# Patient Record
Sex: FEMALE | HISPANIC OR LATINO
[De-identification: names, ages, dates, MRNs, and addresses within clinical notes are randomized per-mention and may not be internally consistent; named-entity substitution may affect disease eponyms.]

---

## 2018-06-14 ENCOUNTER — APPOINTMENT (OUTPATIENT)
Dept: PHYSICAL MEDICINE AND REHAB | Facility: CLINIC | Age: 53
End: 2018-06-14
Payer: COMMERCIAL

## 2018-06-14 DIAGNOSIS — Z78.9 OTHER SPECIFIED HEALTH STATUS: ICD-10-CM

## 2018-06-14 DIAGNOSIS — Z87.39 PERSONAL HISTORY OF OTHER DISEASES OF THE MUSCULOSKELETAL SYSTEM AND CONNECTIVE TISSUE: ICD-10-CM

## 2018-06-14 DIAGNOSIS — Z82.69 FAMILY HISTORY OF OTHER DISEASES OF THE MUSCULOSKELETAL SYSTEM AND CONNECTIVE TISSUE: ICD-10-CM

## 2018-06-14 PROBLEM — Z00.00 ENCOUNTER FOR PREVENTIVE HEALTH EXAMINATION: Status: ACTIVE | Noted: 2018-06-14

## 2018-06-14 PROCEDURE — 99204 OFFICE O/P NEW MOD 45 MIN: CPT

## 2018-06-14 RX ORDER — HYDROCORTISONE 1 %
12 CREAM (GRAM) TOPICAL
Qty: 225 | Refills: 0 | Status: ACTIVE | COMMUNITY
Start: 2018-04-24

## 2018-06-14 RX ORDER — FLUOCINONIDE 0.5 MG/ML
0.05 SOLUTION TOPICAL
Qty: 60 | Refills: 0 | Status: ACTIVE | COMMUNITY
Start: 2018-04-24

## 2018-06-14 RX ORDER — TRETINOIN 0.25 MG/G
0.03 CREAM TOPICAL
Qty: 45 | Refills: 0 | Status: ACTIVE | COMMUNITY
Start: 2017-07-14

## 2018-06-14 RX ORDER — METHYLPREDNISOLONE 4 MG/1
4 TABLET ORAL
Qty: 55 | Refills: 0 | Status: ACTIVE | COMMUNITY
Start: 2018-06-14 | End: 1900-01-01

## 2018-06-14 RX ORDER — TRETINOIN 0.5 MG/G
0.05 CREAM TOPICAL
Qty: 45 | Refills: 0 | Status: ACTIVE | COMMUNITY
Start: 2018-04-24

## 2018-06-14 RX ORDER — MELOXICAM 15 MG/1
15 TABLET ORAL
Qty: 30 | Refills: 0 | Status: ACTIVE | COMMUNITY
Start: 2017-09-29

## 2018-06-14 RX ORDER — CYCLOBENZAPRINE HYDROCHLORIDE 5 MG/1
5 TABLET, FILM COATED ORAL
Qty: 30 | Refills: 0 | Status: ACTIVE | COMMUNITY
Start: 2018-06-06

## 2018-06-14 RX ORDER — ALBUTEROL SULFATE 90 UG/1
108 (90 BASE) AEROSOL, METERED RESPIRATORY (INHALATION)
Qty: 8 | Refills: 0 | Status: ACTIVE | COMMUNITY
Start: 2018-06-07

## 2018-06-15 VITALS — OXYGEN SATURATION: 16 % | WEIGHT: 130 LBS | HEIGHT: 60 IN | BODY MASS INDEX: 25.52 KG/M2

## 2018-06-15 PROBLEM — Z78.9 CONSUMES ALCOHOL OCCASIONALLY: Status: ACTIVE | Noted: 2018-06-14

## 2018-06-15 PROBLEM — Z82.69 FAMILY HISTORY OF SCOLIOSIS: Status: ACTIVE | Noted: 2018-06-14

## 2018-06-15 PROBLEM — Z78.9 NON-SMOKER: Status: ACTIVE | Noted: 2018-06-14

## 2018-06-15 PROBLEM — Z87.39 HISTORY OF SCOLIOSIS: Status: RESOLVED | Noted: 2018-06-14 | Resolved: 2018-06-15

## 2018-06-21 ENCOUNTER — APPOINTMENT (OUTPATIENT)
Dept: PHYSICAL MEDICINE AND REHAB | Facility: CLINIC | Age: 53
End: 2018-06-21

## 2018-06-28 ENCOUNTER — APPOINTMENT (OUTPATIENT)
Dept: PHYSICAL MEDICINE AND REHAB | Facility: CLINIC | Age: 53
End: 2018-06-28
Payer: COMMERCIAL

## 2018-06-28 VITALS — RESPIRATION RATE: 16 BRPM | HEIGHT: 60 IN | WEIGHT: 130 LBS | BODY MASS INDEX: 25.52 KG/M2

## 2018-06-28 PROCEDURE — 99214 OFFICE O/P EST MOD 30 MIN: CPT

## 2018-07-30 ENCOUNTER — APPOINTMENT (OUTPATIENT)
Dept: PHYSICAL MEDICINE AND REHAB | Facility: CLINIC | Age: 53
End: 2018-07-30
Payer: COMMERCIAL

## 2018-07-30 VITALS — HEIGHT: 60 IN | HEART RATE: 87 BPM | RESPIRATION RATE: 16 BRPM | BODY MASS INDEX: 20.62 KG/M2 | WEIGHT: 105 LBS

## 2018-07-30 PROCEDURE — 99214 OFFICE O/P EST MOD 30 MIN: CPT

## 2020-07-07 ENCOUNTER — APPOINTMENT (OUTPATIENT)
Dept: PHYSICAL MEDICINE AND REHAB | Facility: CLINIC | Age: 55
End: 2020-07-07
Payer: COMMERCIAL

## 2020-07-07 VITALS — RESPIRATION RATE: 16 BRPM | BODY MASS INDEX: 20.62 KG/M2 | HEIGHT: 60 IN | OXYGEN SATURATION: 98 % | WEIGHT: 105 LBS

## 2020-07-07 DIAGNOSIS — M75.41 IMPINGEMENT SYNDROME OF RIGHT SHOULDER: ICD-10-CM

## 2020-07-07 PROCEDURE — 99214 OFFICE O/P EST MOD 30 MIN: CPT

## 2021-01-29 ENCOUNTER — APPOINTMENT (OUTPATIENT)
Dept: PHYSICAL MEDICINE AND REHAB | Facility: CLINIC | Age: 56
End: 2021-01-29
Payer: COMMERCIAL

## 2021-01-29 DIAGNOSIS — M54.16 RADICULOPATHY, LUMBAR REGION: ICD-10-CM

## 2021-01-29 PROCEDURE — 99214 OFFICE O/P EST MOD 30 MIN: CPT

## 2021-01-29 PROCEDURE — 99072 ADDL SUPL MATRL&STAF TM PHE: CPT

## 2021-01-29 NOTE — ASSESSMENT
[FreeTextEntry1] : Impression:\par 1. Left L5/S1 Radiculopathy\par \par Plan: The history, physical examination, and prior imaging were reviewed. Symptoms are consistent with Left L5/S1 Radiculopathy with likely worsening L5-S1 protrusion. The imaging results and diagnosis were reviewed with the patient along with treatment options. The patient has been consistent with conservative management thru PT/HEP and oral medication, however is currently is significant increased pain. We will obtain a new MR LSpine in preparation for potential CIRILO. The patient will follow in 1-2 weeks. The patient expressed verbal understanding and is in agreement with the plan of care. All of the patient's questions and concerns were addressed during today's visit.\par \par At total of 30 minutes was spent for the duration of this encounter.\par

## 2021-01-29 NOTE — HISTORY OF PRESENT ILLNESS
[FreeTextEntry1] : Ms. NESTOR LAWRENCE is a very pleasant 52 year female who comes in for follow up evaluation of low back pain. The patient was doing well until a week ago when she developd acute severe LBP radiating to the left leg again. The pain is located primarily  on her lower back with radiating symptoms to the left lower extremity intermittent in nature and described as tight and achy . The pain is rated as 7-8/10 during today's visit, and ranges from 5-10/10. The patient's symptoms are aggravated by twisting/bending and alleviated by rest and medication . The patient denies any night pain, numbness/tingling, weakness, or bowel/bladder dysfunction. The patient has no other complaints at this time.

## 2021-01-29 NOTE — PHYSICAL EXAM
[FreeTextEntry1] : LUMBAR\par LS ROM: Flexion limited (+) axial/LLE Sx. Extension, side-bending, rotation, oblique extension all full/pain free.  \par HIP ROM: Full and pain B/L.\par PALP: No TTP midline spinous processes, paravertebral muscles, SIJ, or greater trochanters B/L.\par INSP: Spine alignment is midline, with no evidence of scoliosis.\par STRENGTH: 5/5 bilateral hip flexors, knee extensors, ankle dorsiflexors, long toe extensors, ankle plantar flexors, hip abductors.\par SENS: Grossly intact to LT BLE.\par REFLEXES: Symmetric patella, achilles. \par TONE: Normal, No clonus.\par STANCE: No Trendelenburg with single leg stance.\par GAIT: Non antalgic, normal reciprocating heel to toe\par SPECIAL: SLR and Slump (+) LEFT. \par

## 2021-03-04 ENCOUNTER — APPOINTMENT (OUTPATIENT)
Dept: MRI IMAGING | Facility: HOSPITAL | Age: 56
End: 2021-03-04

## 2021-08-17 ENCOUNTER — APPOINTMENT (OUTPATIENT)
Dept: PHYSICAL MEDICINE AND REHAB | Facility: CLINIC | Age: 56
End: 2021-08-17
Payer: COMMERCIAL

## 2021-08-17 VITALS — RESPIRATION RATE: 16 BRPM | WEIGHT: 105 LBS | BODY MASS INDEX: 20.62 KG/M2 | HEIGHT: 60 IN

## 2021-08-17 DIAGNOSIS — M54.12 RADICULOPATHY, CERVICAL REGION: ICD-10-CM

## 2021-08-17 PROCEDURE — 99214 OFFICE O/P EST MOD 30 MIN: CPT

## 2021-08-17 NOTE — ASSESSMENT
[FreeTextEntry1] : Impression:\par 1. Right C7 Radiculopathy\par \par Plan: The history and physical examination were reviewed. Symptoms seem consistent with Right C7 radiculopathy likely due to disc protrusion. The diagnosis was discussed with the patient along with treatment options including physical therapy, oral medication, interventional spine procedures, and surgery; as well as alternative therapeutics such as acupuncture and massage. We also discussed the importance of activity modification, ergonomics and posture in the long term management of the condition. At this time I am recommending that the patient undergo an MRI of the CSpine to further evaluate for disc pathology and nerve root involvement in preparation for CIRILO. The patient was educated on red flag symptoms and was instructed to call the office should the current condition worsen or new symptoms develop. The patient will follow up in 1-2 weeks. The patient expressed verbal understanding and is in agreement with the plan of care. All of the patient's questions and concerns were addressed during today's visit.\par

## 2021-08-17 NOTE — HISTORY OF PRESENT ILLNESS
[FreeTextEntry1] : Ms. NESTOR LAWRENCE is a very pleasant 52 year female who comes in for evaluation of a new problem, neck pain radiating to the right arm, which has been on/off for several months however has progressively worsened to the point where now she feels her arm is heavy/numb. The pain originates in the neck radiating to the right arm/hand/fingers with associated numbness, intermittent in nature and described as achy, heavy, tingling. The pain is rated as 6/10 during today's visit, and ranges from 4-8/10. The patient's symptoms are worse in the morning on waking, aggravated by certain positions of the neck and alleviated by rest. The patient denies any night pain, weakness, or bowel/bladder dysfunction. The patient continues to have LBP radiating to the right leg. The patient has no other complaints at this time.

## 2021-08-17 NOTE — PHYSICAL EXAM
[FreeTextEntry1] : CERVICAL\par CERVICAL ROM: Flexion, extension, side-bending, rotation, oblique extension all full/pain free.  \par SHOULDER ROM: Full and pain free B/L.\par PALP: No TTP midline spinous processes, paravertebral muscles, trapezius, levator scapulae or shoulder region B/L.\par INSP: Spine alignment is midline, No evidence of scoliosis.\par STRENGTH: 5/5 bilateral shoulder abductors, elbow flexors, elbow extensors, wrist extensors, hand intrinsics, long finger flexors to D3 and D5.\par TONE: Normal, No clonus.\par REFLEXES: Symmetric biceps, triceps, brachioradialis, pronator teres. Lozano's (-) B/L.\par SENSATION: Grossly intact LT BUE.\par GAIT: Non antalgic, Normal reciprocating heel to toe.\par SPECIAL: Spurling's (+) Right. Axial Compression (-). \par \par \par LUMBAR\par LS ROM: Flexion limited (+) axial/LLE Sx. Extension, side-bending, rotation, oblique extension all full/pain free.  \par HIP ROM: Full and pain B/L.\par PALP: No TTP midline spinous processes, paravertebral muscles, SIJ, or greater trochanters B/L.\par INSP: Spine alignment is midline, with no evidence of scoliosis.\par STRENGTH: 5/5 bilateral hip flexors, knee extensors, ankle dorsiflexors, long toe extensors, ankle plantar flexors, hip abductors.\par SENS: Grossly intact to LT BLE.\par REFLEXES: Symmetric patella, achilles. \par TONE: Normal, No clonus.\par STANCE: No Trendelenburg with single leg stance.\par GAIT: Non antalgic, normal reciprocating heel to toe\par SPECIAL: SLR and Slump (+) LEFT. \par

## 2021-08-24 ENCOUNTER — APPOINTMENT (OUTPATIENT)
Dept: MRI IMAGING | Facility: CLINIC | Age: 56
End: 2021-08-24
Payer: COMMERCIAL

## 2021-08-24 ENCOUNTER — OUTPATIENT (OUTPATIENT)
Dept: OUTPATIENT SERVICES | Facility: HOSPITAL | Age: 56
LOS: 1 days | End: 2021-08-24

## 2021-08-24 ENCOUNTER — RESULT REVIEW (OUTPATIENT)
Age: 56
End: 2021-08-24

## 2021-08-24 PROCEDURE — 72141 MRI NECK SPINE W/O DYE: CPT | Mod: 26

## 2021-08-31 ENCOUNTER — APPOINTMENT (OUTPATIENT)
Dept: PHYSICAL MEDICINE AND REHAB | Facility: CLINIC | Age: 56
End: 2021-08-31
Payer: COMMERCIAL

## 2021-08-31 VITALS — WEIGHT: 105 LBS | RESPIRATION RATE: 16 BRPM | HEIGHT: 60 IN | OXYGEN SATURATION: 98 % | BODY MASS INDEX: 20.62 KG/M2

## 2021-08-31 PROCEDURE — 99213 OFFICE O/P EST LOW 20 MIN: CPT

## 2021-08-31 NOTE — HISTORY OF PRESENT ILLNESS
[FreeTextEntry1] : Ms. NESTOR LAWRENCE is a very pleasant 52 year female who comes in for MRI review and follow up evaluation of neck pain radiating to the right arm, which has been on/off for several months however has progressively worsened to the point where now she feels her arm is heavy/numb. The pain originates in the neck radiating to the right arm/hand/fingers with associated numbness, intermittent in nature and described as achy, heavy, tingling. The pain is rated as 6/10 during today's visit, and ranges from 4-8/10. The patient's symptoms are worse in the morning on waking, aggravated by certain positions of the neck and alleviated by rest. The patient denies any night pain, weakness, or bowel/bladder dysfunction. The patient continues to have LBP radiating to the right leg. The patient has no other complaints at this time.

## 2021-08-31 NOTE — PHYSICAL EXAM
[FreeTextEntry1] : CERVICAL\par CERVICAL ROM: Flexion, extension, side-bending, rotation, oblique extension all full/pain free.  \par SHOULDER ROM: Full and pain free B/L.\par PALP: No TTP midline spinous processes, paravertebral muscles, trapezius, levator scapulae or shoulder region B/L.\par INSP: Spine alignment is midline, No evidence of scoliosis.\par STRENGTH: 5/5 bilateral shoulder abductors, elbow flexors, elbow extensors, wrist extensors, hand intrinsics, long finger flexors to D3 and D5.\par TONE: Normal, No clonus.\par REFLEXES: Symmetric biceps, triceps, brachioradialis, pronator teres. Lozano's (-) B/L.\par SENSATION: Grossly intact LT BUE.\par GAIT: Non antalgic, Normal reciprocating heel to toe.\par SPECIAL: Spurling's (+) Right. Axial Compression (-). \par \par LUMBAR\par LS ROM: Flexion full/pain free. Extension/R-oblique extension full (+) axial Sx. \par HIP ROM: Full and pain B/L.\par PALP: No TTP midline spinous processes, paravertebral muscles, SIJ, or greater trochanters B/L.\par INSP: Spine alignment is midline, with no evidence of scoliosis.\par STRENGTH: 5/5 bilateral hip flexors, knee extensors, ankle dorsiflexors, long toe extensors, ankle plantar flexors, hip abductors.\par SENS: Grossly intact to LT BLE.\par REFLEXES: Symmetric patella, achilles. \par TONE: Normal, No clonus.\par STANCE: No Trendelenburg with single leg stance.\par GAIT: Non antalgic, normal reciprocating heel to toe\par SPECIAL: SLR and Slump (-) B/L.\par

## 2021-08-31 NOTE — ASSESSMENT
[FreeTextEntry1] : Impression:\par 1. Right Ulnar Neuropathy\par \par Plan: The history and physical examination were reviewed along with new imaging. Symptoms seem more consistent with Right Ulnar Neuropathy as there is no evidence of neurocompressive pathology on CS MRI, the lower back symptoms are most consistent with facet mediated pain. The imaging and diagnosis were discussed with the patient along with treatment options including physical therapy, oral medication, interventional spine procedures, and surgery; as well as alternative therapeutics such as acupuncture and massage. We also discussed the importance of activity modification, ergonomics and posture in the long term management of the condition. At this time the patient is interested in conservative treatment, we will proceed with a course of PT. We discussed the possibility of EMG/NCV in the future if needed. The patient was educated on red flag symptoms and was instructed to call the office should the current condition worsen or new symptoms develop. The patient will follow up in 6-8 weeks. The patient expressed verbal understanding and is in agreement with the plan of care. All of the patient's questions and concerns were addressed during today's visit.\par

## 2022-03-07 ENCOUNTER — APPOINTMENT (OUTPATIENT)
Dept: PHYSICAL MEDICINE AND REHAB | Facility: CLINIC | Age: 57
End: 2022-03-07
Payer: COMMERCIAL

## 2022-03-07 PROCEDURE — 99213 OFFICE O/P EST LOW 20 MIN: CPT

## 2022-03-07 NOTE — ASSESSMENT
[FreeTextEntry1] : Impression:\par 1. Lumbar Facet Syndrome\par 2. L5/S1 Disc Protrusion\par \par Plan: The history, physical examination and imaging were reviewed. The diagnosis was discussed with the patient along with treatment options including physical therapy, oral medication, interventional spine procedures, and surgery; as well as alternative therapeutics such as acupuncture and massage. We also discussed the importance of weight loss, ergonomics, and posture as they pertain to the current condition as well as overall health and well being. I provided the patient with a renewed referral for physical therapy. We emphasized the importance of the home exercise program. The patient was educated on red flag symptoms and was instructed to call the office should the current condition worsen or new symptoms develop. The patient will follow up in 2-3 months. The patient expressed verbal understanding and is in agreement with the plan of care. All of the patient's questions and concerns were addressed during today's visit.

## 2022-03-07 NOTE — HISTORY OF PRESENT ILLNESS
[FreeTextEntry1] : Ms. NESTOR LAWRENCE is a very pleasant 52 year female who comes in for follow up of low back pain. The patient does PT which does help when she is consistent. The pain is primarily axial, left-sided, with occasional LEFT leg involvement, intermittent and described as tight and achy. The pain is rated as 7-8/10 during today's visit, and ranges from 5-10/10. The patient's symptoms are aggravated by twisting/bending and alleviated by rest and medication . The patient denies any night pain, numbness/tingling, weakness, or bowel/bladder dysfunction. The patient has no other complaints at this time.

## 2022-03-07 NOTE — PHYSICAL EXAM
[FreeTextEntry1] : LUMBAR\par GEN: AAOx3. NAD.\par INSP: Spine alignment midline. No evidence of scoliosis.\par STANCE: Trendelenburg/SLS (-) R (-) L. \par GAIT: (-) Antalgic. Normal reciprocating heel to toe\par SENS: Grossly intact to LT BLE.\par REFLEXES: Symmetric patella, achilles. \par TONE: Normal. No clonus.\par SPECIAL: SLR/Slump (-) R (-) L.   Gaenslen (-) R (-) L.\par                 FADIR (-) R (-) L.          IVETT (-) R (-) L.  \par PALP: Midline/Spinous processes (-).   Paraspinals (-) R  (-) L.   \par            QL (-) R (-) L.      SIJ (-) R (-) L.            GTB (-) R (-) L.\par \par LSpine          ROM    Axial Sx    LE Sx \par Flex               Full       (-)           R (-) L (-).\par Ext                 Full       (+)          R (-) L (-).\par Lat Flex         Full       (-)           R (-) L (-).       \par Rotation         Full       (-)           R (-) L (-). \par Oblique Ext    Full       (+)L         R (-) L (-).  \par \par HIP ROM:    RIGHT   Sx        LEFT   Sx\par Flex             Full       (-)         Full     (-)\par IR                 Full       (-)        Full     (-)\par ER               Full       (-)        Full     (-)\par \par STRENGTH:    RIGHT      LEFT\par Hip Flex            5/5 5/5\par Hip ABd            5/5 5/5\par Knee Ext           5/5 5/5\par Ankle DF           5/5 5/5\par Ankle PF           5/5 5/5\par EHL                   5/5 5/5\par EV                    5/5 5/5

## 2022-09-30 ENCOUNTER — APPOINTMENT (OUTPATIENT)
Dept: PHYSICAL MEDICINE AND REHAB | Facility: CLINIC | Age: 57
End: 2022-09-30

## 2022-09-30 VITALS — WEIGHT: 104.25 LBS | HEIGHT: 60 IN | BODY MASS INDEX: 20.47 KG/M2

## 2022-09-30 DIAGNOSIS — M54.50 LOW BACK PAIN, UNSPECIFIED: ICD-10-CM

## 2022-09-30 PROCEDURE — 99213 OFFICE O/P EST LOW 20 MIN: CPT

## 2022-09-30 NOTE — HISTORY OF PRESENT ILLNESS
[FreeTextEntry1] : Ms. NESTOR LAWRENCE is a very pleasant 52 year female who comes in for follow up of low back pain. The patient was doing well until recently when she lifted something and rotated, which resulted in acute LBP. She rested for a few days which significantly improved her condition, however there is still some residual pain. The pain is primarily axial, with occasional RIGHT leg involvement, intermittent and described as tight and achy. The pain is rated as 5/10 and ranges from 3-7/10. The patient's symptoms are aggravated by twisting/bending and alleviated by rest and medication . The patient denies any night pain, numbness/tingling, weakness, or bowel/bladder dysfunction. The patient has no other complaints at this time.

## 2022-09-30 NOTE — ASSESSMENT
[FreeTextEntry1] : Impression:\par 1. Lumbago/Strain\par 2. Discogenic pain\par \par The history, physical examination and imaging were reviewed. The diagnosis was reviewed with the patient along with treatment options including physical therapy, oral medication, interventional spine procedures, and surgery; as well as alternative therapeutics such as acupuncture and massage. \par \par Plan: \par 1. PT Referral. Emphasis on HEP.\par 2. Biomechanics Reviewed.\par 3. Follow up 2-3 months. \par \par The patient was educated on red flag symptoms and was instructed to call the office should the current condition worsen or new symptoms develop. The patient expressed verbal understanding and is in agreement with the plan of care. All of the patient's questions and concerns were addressed during today's visit.\par \par